# Patient Record
Sex: MALE | Race: WHITE | NOT HISPANIC OR LATINO | Employment: UNEMPLOYED | URBAN - METROPOLITAN AREA
[De-identification: names, ages, dates, MRNs, and addresses within clinical notes are randomized per-mention and may not be internally consistent; named-entity substitution may affect disease eponyms.]

---

## 2019-01-02 ENCOUNTER — HOSPITAL ENCOUNTER (EMERGENCY)
Facility: MEDICAL CENTER | Age: 24
End: 2019-01-03
Attending: EMERGENCY MEDICINE
Payer: COMMERCIAL

## 2019-01-02 DIAGNOSIS — F17.210 CONTINUOUS DEPENDENCE ON CIGARETTE SMOKING: ICD-10-CM

## 2019-01-02 DIAGNOSIS — F10.920 ALCOHOLIC INTOXICATION WITHOUT COMPLICATION (HCC): ICD-10-CM

## 2019-01-02 LAB — POC BREATHALIZER: 0.33 PERCENT (ref 0–0.01)

## 2019-01-02 PROCEDURE — 99285 EMERGENCY DEPT VISIT HI MDM: CPT

## 2019-01-02 PROCEDURE — 302970 POC BREATHALIZER

## 2019-01-02 PROCEDURE — 80307 DRUG TEST PRSMV CHEM ANLYZR: CPT

## 2019-01-02 PROCEDURE — 302970 POC BREATHALIZER: Performed by: EMERGENCY MEDICINE

## 2019-01-03 VITALS
RESPIRATION RATE: 16 BRPM | HEIGHT: 66 IN | WEIGHT: 140 LBS | DIASTOLIC BLOOD PRESSURE: 78 MMHG | SYSTOLIC BLOOD PRESSURE: 130 MMHG | HEART RATE: 78 BPM | BODY MASS INDEX: 22.5 KG/M2 | TEMPERATURE: 98.8 F | OXYGEN SATURATION: 96 %

## 2019-01-03 LAB
AMPHET UR QL SCN: NEGATIVE
BARBITURATES UR QL SCN: NEGATIVE
BENZODIAZ UR QL SCN: NEGATIVE
BZE UR QL SCN: NEGATIVE
CANNABINOIDS UR QL SCN: POSITIVE
METHADONE UR QL SCN: NEGATIVE
OPIATES UR QL SCN: NEGATIVE
OXYCODONE UR QL SCN: NEGATIVE
PCP UR QL SCN: NEGATIVE
POC BREATHALIZER: 0.05 PERCENT (ref 0–0.01)
POC BREATHALIZER: 0.09 PERCENT (ref 0–0.01)
PROPOXYPH UR QL SCN: ABNORMAL

## 2019-01-03 PROCEDURE — 302970 POC BREATHALIZER: Performed by: EMERGENCY MEDICINE

## 2019-01-03 PROCEDURE — 99244 OFF/OP CNSLTJ NEW/EST MOD 40: CPT | Performed by: PSYCHIATRY & NEUROLOGY

## 2019-01-03 PROCEDURE — 90791 PSYCH DIAGNOSTIC EVALUATION: CPT

## 2019-01-03 NOTE — ED NOTES
Patient's home medications have been reviewed by the pharmacy team.     Past Medical History:   Diagnosis Date   • Hypertension    • Psychiatric disorder        Patient's Medications   New Prescriptions    No medications on file   Previous Medications    No medications on file   Modified Medications    No medications on file   Discontinued Medications    AMOXICILLIN-CLAVULANATE (AUGMENTIN) 875-125 MG TABS    Take 1 Tab by mouth 2 times a day.    FLUTICASONE (FLONASE) 50 MCG/ACT NASAL SPRAY    Spray 2 Sprays in nose every day. Each Nostril    MONTELUKAST (SINGULAIR) 10 MG TABS    Take 1 Tab by mouth every day.        C/o os SI    A:  Medications do not appear to be contributing to current complaints.       P:    No recommendations at this time. No home medications to reorder.          Dina Ashraf, PharmD., BCPS

## 2019-01-03 NOTE — ED NOTES
Report received, patient moved to a different room for comfort measures, patient is calm at this time, SI precautions in place.  Will monitor.

## 2019-01-03 NOTE — ED NOTES
Urine collected and sent. Given an update about the plan in getting admitted to psychiatric facility.
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
## 2019-01-03 NOTE — ED PROVIDER NOTES
ED Provider Note    Patient did not have any events overnight.  He sobered.  He was persistently suicidal.  Arrangements will be made for psychiatric care.  No medical complaints or findings on examination.

## 2019-01-03 NOTE — ED PROVIDER NOTES
"ED Provider Note    CHIEF COMPLAINT  Chief Complaint   Patient presents with   • Suicidal Ideation     pt BIB EMS, per EMS pt was found intoxicated at [address] attempting to jump in front of cars and stated to EMS he planned to \"hang himself, shoot himself, or get hit by a car.\" pt has psychiatric hx with admission to [facility] in [place] (2016), meth use, cocaine use, early onset HTN. per EMS pt was tachy in the 130s but denies stimulant use today, but admits to cocaine use x 2 weeks ago.    • Alcohol Intoxication     POC breathalyzer 0.33 on arrival       HPI  Brittani Lu is a 23 y.o. male here for evaluation of alcohol abuse and suicidal ideation.  Patient was found by EMS to be attempting to jump in front of cars.  He then made a statement to them that he would hang himself or shoot himself at some points, and an attempt to end his life.  Patient has no noted trauma.  There is no further history from patient.    PAST MEDICAL HISTORY   has a past medical history of Hypertension and Psychiatric disorder.    SOCIAL HISTORY  Social History     Social History Main Topics   • Smoking status: Current Every Day Smoker     Packs/day: 0.50     Years: 4.00     Types: Cigarettes   • Smokeless tobacco: Never Used   • Alcohol use No   • Drug use: No   • Sexual activity: Not on file       SURGICAL HISTORY  patient denies any surgical history    CURRENT MEDICATIONS  Home Medications     Reviewed by Baltazar Vela R.N. (Registered Nurse) on 01/02/19 at 2242  Med List Status: Partial   Medication Last Dose Status   amoxicillin-clavulanate (AUGMENTIN) 875-125 MG TABS  Active   fluticasone (FLONASE) 50 MCG/ACT nasal spray  Active   montelukast (SINGULAIR) 10 MG TABS  Active                ALLERGIES  No Known Allergies    REVIEW OF SYSTEMS  See HPI for further details. Review of systems as above, otherwise all other systems are negative.     PHYSICAL EXAM  VITAL SIGNS: /71   Pulse (!) 125   " "Temp 36.8 °C (98.3 °F) (Temporal)   Resp 18   Ht 1.676 m (5' 6\")   Wt 63.5 kg (140 lb)   SpO2 98%   BMI 22.60 kg/m²     Constitutional: Well developed, well nourished. No acute distress.  HEENT: Normocephalic, atraumatic. MMM  Neck: Supple, Full range of motion   Chest/Pulmonary:  No respiratory distress.  Equal expansion   Musculoskeletal: No deformity, no edema, neurovascular intact.   Neuro: Clear speech, appropriate, cooperative, cranial nerves II-XII grossly intact.  Psych: Normal mood and affect    Results for orders placed or performed during the hospital encounter of 01/02/19   POC BREATHALIZER   Result Value Ref Range    POC Breathalizer 0.333 (A) 0.00 - 0.01 Percent       PROCEDURES     MEDICAL RECORD  I have reviewed patient's medical record and pertinent results are listed above.    COURSE & MEDICAL DECISION MAKING  I have reviewed any medical record information, laboratory studies and radiographic results as noted above.    1:55 AM  The pt is now more awake, and alert.  He states 'i hate myself' and 'im a piece of shit.'  These are the reasons that he wants to kill himself.  When sober, he will need to be seen by lifeskills.  The pt admits to alcohol, but denies any pill ingestion or overdose.     Al legal paperwork has been completed.     The pt has been signed out to my partner, who will have lifeskill see and evaluate him.      FINAL IMPRESSION  Alcohol abuse   Depression       Electronically signed by: Mars Gallardo, 1/3/2019 1:13 AM      "
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
## 2019-01-03 NOTE — ED TRIAGE NOTES
"Chief Complaint   Patient presents with   • Suicidal Ideation     pt BIB EMS, per EMS pt wqas found intoxicated at [address] attempting to jump in front of cars and stated to EMS he planned to \"hang himself, shoot himself, or get hit by a car.\" pt has psychiatric hx with admission to [facility] in [place] (2016), meth use, cocaine use, early onset HTN. per EMS pt was tachy in the 130s but denies stimulant use today, but admits to cocaine use x 2 weeks ago.    • Alcohol Intoxication     POC breathalyzer 0.33 on arrival       Pt appears intoxicated but cooperative and endorses previous suicide attempt.   "

## 2019-01-03 NOTE — DISCHARGE PLANNING
Medical Social Work    Referral: Legal Hold    Intervention: Legal Hold Paperwork given to SW by [facility] RN Marti    Legal Hold Initiated: Date: 1/3/2018 Time: 0200    Patient’s Insurance Listed on Face Sheet: None    Referrals sent to: [facility]    This referral contains the following information:  1) Face sheet __x__  2) Page 1 and Page 2 of Legal Hold _x___  3) Alert Team Assessment/Psych Assessment _x___  4) Head to toe physical exam ___x_  5) Urine Drug Screen __x__  6) Blood Alcohol __x__  7) Vital signs ___x_  8) Pregnancy test when applicable n/a___  9) Medications list __x__    Plan: Patient will transfer to mental health facility once acceptance is obtained

## 2019-01-03 NOTE — ED NOTES
Received report from Baltazar LALA assumed care done.  Breathalyzer rechecked=0.05  Pt calm and acting appropriately and polite at staff.  Sitter outside the room watching pt. Will notify behavioral health for consult.

## 2019-01-03 NOTE — ED NOTES
Break RN: Pt sleeping with visible rise and fall of chest. Resp are even and unlabored. NAD. Sitter in place.

## 2019-01-04 NOTE — DISCHARGE PLANNING
LSW conformed with [facility] staff member Frances regarding confirmed transportation at 11pm to [facility]     LSW completed packet and informed bedside RN regarding transfer at 11pm.     LSW placed call to [facility] staff member Mary regarding confirmed transfer at 11pm

## 2019-01-04 NOTE — ED NOTES
[facility] being [facility]  Belongings and transfer paperwork sent with patient.  Pt ambulatory from department without difficulty.

## 2019-01-04 NOTE — ED NOTES
Report received from Ramonita LALA.  Pt resting comfortably on cot.  Pt has no needs at this time.  Sitter outside room.  Will continue to monitor.

## 2019-01-04 NOTE — DISCHARGE PLANNING
RAMO completed [facility] request for transfer to [facility] informed by [facility] currenting admitting provider is Dr. Wilson and requested time for admission is 11pm.

## 2019-01-04 NOTE — ED NOTES
Patients father called and voiced some concerned, patients mom moved around and patient is a self hating type of person, patient has in the past been on antidepressants, patient is self medicating with alcohol to cope with this.  Parents are very concerned, they would like him to be in rehab and inpatient services.

## 2019-01-04 NOTE — DISCHARGE PLANNING
MSW received acceptance from [facility], however with pt's insurance has a high co-pay. MSW spoke with pt and he cannot afford it. Pt awaiting transfer to  or [facility].

## 2020-09-22 ENCOUNTER — HOSPITAL ENCOUNTER (INPATIENT)
Dept: HOSPITAL 8 - 3E | Age: 25
LOS: 2 days | Discharge: HOME | DRG: 753 | End: 2020-09-24
Attending: PSYCHIATRY & NEUROLOGY | Admitting: PSYCHIATRY & NEUROLOGY
Payer: MEDICAID

## 2020-09-22 VITALS — HEIGHT: 66 IN | BODY MASS INDEX: 24.77 KG/M2 | WEIGHT: 154.1 LBS

## 2020-09-22 VITALS — DIASTOLIC BLOOD PRESSURE: 77 MMHG | SYSTOLIC BLOOD PRESSURE: 116 MMHG

## 2020-09-22 DIAGNOSIS — F19.10: ICD-10-CM

## 2020-09-22 DIAGNOSIS — F10.20: ICD-10-CM

## 2020-09-22 DIAGNOSIS — F17.200: ICD-10-CM

## 2020-09-22 DIAGNOSIS — R45.851: ICD-10-CM

## 2020-09-22 DIAGNOSIS — G47.00: ICD-10-CM

## 2020-09-22 DIAGNOSIS — J45.909: ICD-10-CM

## 2020-09-22 DIAGNOSIS — F31.30: Primary | ICD-10-CM

## 2020-09-22 LAB — MICROSCOPIC: (no result)

## 2020-09-22 PROCEDURE — 93005 ELECTROCARDIOGRAM TRACING: CPT

## 2020-09-22 PROCEDURE — 81003 URINALYSIS AUTO W/O SCOPE: CPT

## 2020-09-22 RX ADMIN — DIVALPROEX SODIUM SCH MG: 500 TABLET, DELAYED RELEASE ORAL at 22:37

## 2020-09-22 RX ADMIN — NICOTINE SCH PATCH: 21 PATCH, EXTENDED RELEASE TRANSDERMAL at 22:37

## 2020-09-22 RX ADMIN — DOXEPIN HYDROCHLORIDE SCH MG: 25 CAPSULE ORAL at 22:37

## 2020-09-23 VITALS — SYSTOLIC BLOOD PRESSURE: 145 MMHG | DIASTOLIC BLOOD PRESSURE: 80 MMHG

## 2020-09-23 VITALS — DIASTOLIC BLOOD PRESSURE: 66 MMHG | SYSTOLIC BLOOD PRESSURE: 107 MMHG

## 2020-09-23 RX ADMIN — NICOTINE SCH PATCH: 21 PATCH, EXTENDED RELEASE TRANSDERMAL at 08:27

## 2020-09-23 RX ADMIN — DOXEPIN HYDROCHLORIDE SCH MG: 25 CAPSULE ORAL at 19:55

## 2020-09-23 RX ADMIN — DIVALPROEX SODIUM SCH MG: 500 TABLET, DELAYED RELEASE ORAL at 19:55

## 2020-09-23 RX ADMIN — DIVALPROEX SODIUM SCH MG: 500 TABLET, DELAYED RELEASE ORAL at 08:27

## 2020-09-24 VITALS — SYSTOLIC BLOOD PRESSURE: 122 MMHG | DIASTOLIC BLOOD PRESSURE: 80 MMHG

## 2020-09-24 RX ADMIN — DIVALPROEX SODIUM SCH MG: 500 TABLET, DELAYED RELEASE ORAL at 08:59

## 2020-09-24 RX ADMIN — NICOTINE SCH PATCH: 21 PATCH, EXTENDED RELEASE TRANSDERMAL at 08:59

## 2020-10-12 ENCOUNTER — HOSPITAL ENCOUNTER (EMERGENCY)
Facility: MEDICAL CENTER | Age: 25
End: 2020-10-12
Attending: EMERGENCY MEDICINE
Payer: MEDICAID

## 2020-10-12 VITALS
HEIGHT: 66 IN | OXYGEN SATURATION: 98 % | WEIGHT: 134.92 LBS | BODY MASS INDEX: 21.68 KG/M2 | SYSTOLIC BLOOD PRESSURE: 118 MMHG | DIASTOLIC BLOOD PRESSURE: 70 MMHG | RESPIRATION RATE: 16 BRPM | TEMPERATURE: 97 F | HEART RATE: 80 BPM

## 2020-10-12 DIAGNOSIS — F10.920 ALCOHOLIC INTOXICATION WITHOUT COMPLICATION (HCC): ICD-10-CM

## 2020-10-12 LAB — POC BREATHALIZER: 0.24 PERCENT (ref 0–0.01)

## 2020-10-12 PROCEDURE — 99284 EMERGENCY DEPT VISIT MOD MDM: CPT

## 2020-10-12 PROCEDURE — 302970 POC BREATHALIZER: Performed by: EMERGENCY MEDICINE

## 2020-10-12 PROCEDURE — A9270 NON-COVERED ITEM OR SERVICE: HCPCS

## 2020-10-12 PROCEDURE — 700102 HCHG RX REV CODE 250 W/ 637 OVERRIDE(OP)

## 2020-10-12 RX ORDER — IBUPROFEN 600 MG/1
600 TABLET ORAL ONCE
Status: COMPLETED | OUTPATIENT
Start: 2020-10-12 | End: 2020-10-12

## 2020-10-12 RX ADMIN — IBUPROFEN 600 MG: 600 TABLET, FILM COATED ORAL at 06:26

## 2020-10-12 ASSESSMENT — ENCOUNTER SYMPTOMS: FEVER: 0

## 2020-10-12 NOTE — ED TRIAGE NOTES
"Chief Complaint   Patient presents with   • Alcohol Intoxication     PT BIB [facility] because he was not able to walk.     Blood Pressure 118/70   Pulse 80   Temperature 36.1 °C (97 °F)   Respiration 16   Height 1.676 m (5' 6\")   Weight 61.2 kg (134 lb 14.7 oz)   Oxygen Saturation 98%   Body Mass Index 21.78 kg/m²     "

## 2020-10-12 NOTE — ED NOTES
"Pt discharged home. Pt able to ambulate safely with a strong steady gait. Pt in possession of belongings. Pt provided discharge education and information pertaining to medications and follow up appointments. Pt received copy of discharge instructions and verbalized understanding. /70   Pulse 80   Temp 36.1 °C (97 °F)   Resp 16   Ht 1.676 m (5' 6\")   Wt 61.2 kg (134 lb 14.7 oz)   SpO2 98%   BMI 21.78 kg/m²     "

## 2020-10-12 NOTE — ED PROVIDER NOTES
"ED Provider Note   10/12/2020  3:16 AM    Means of Arrival: EMS  History obtained by: EMS  Limitations: intoxicated    CHIEF COMPLAINT  Chief Complaint   Patient presents with   • Alcohol Intoxication     PT BRENDA WITT because he was not able to walk.       HPI  Brittani Lu is a 25 y.o. male who is homeless presenting with EMS after he was found down.  Smells of alcohol.  He is not providing a history besides saying he is homeless.  Alcohol level on arrival just over 0.2 on breathalyzer.    REVIEW OF SYSTEMS  Review of Systems   Reason unable to perform ROS: Limited due to not wanting to participate and alcohol intoxication.   Constitutional: Negative for fever.   He says he has no concerns in general.  See HPI for further details.     PAST MEDICAL HISTORY   has a past medical history of Asthma, Hypertension, and Psychiatric disorder.    SOCIAL HISTORY  Social History     Tobacco Use   • Smoking status: Current Every Day Smoker     Packs/day: 0.50     Years: 4.00     Pack years: 2.00     Types: Cigarettes   • Smokeless tobacco: Never Used   Substance and Sexual Activity   • Alcohol use: Yes     Comment: 5 drinks daily    • Drug use: Yes     Types: Inhaled   • Sexual activity: Not on file       SURGICAL HISTORY  patient denies any surgical history    CURRENT MEDICATIONS  Home Medications    **Home medications have not yet been reviewed for this encounter**         ALLERGIES  No Known Allergies    PHYSICAL EXAM  VITAL SIGNS: /70   Pulse 80   Temp 36.1 °C (97 °F)   Resp 16   Ht 1.676 m (5' 6\")   Wt 61.2 kg (134 lb 14.7 oz)   SpO2 98%   BMI 21.78 kg/m²    Pulse ox interpretation: I interpret this pulse ox as normal.  Constitutional: Laying on ED stretcher in hallway, awakens to voice.  HENT: Normocephalic, Atraumatic, Bilateral external ears normal. Nose normal.   Eyes: Pupils are equal. Conjunctiva normal, non-icteric.   Heart: Regular rate and hythm, no murmurs.    Lungs: No respiratory distress, " regular respirations.   Abdomen: Soft and nontender.  Skin: Warm, Dry, No erythema, No rash.   Neurologic: Alert and oriented to person but not place.  His speech is mildly slurred.  He is moving all extremities spontaneously.  MSK: Dirt on extremities but no obvious signs of trauma.  Psychiatric: Affect normal, Judgment normal, Mood normal, Appears appropriate and not intoxicated.   Physical Exam      COURSE & MEDICAL DECISION MAKING  Pertinent Labs & Imaging studies reviewed. (See chart for details)    3:16 AM This is an emergent evaluation of a 25 y.o., male who presents with alcohol tox occasion.  He does awaken to verbal stimulus.  He says he has no complaints.  He still shows signs of intoxication with slurred speech.  Will reevaluate in 30 to 60 minutes.    06:19 AM  Now up and ambulatory in department.  Asking for discharge paperwork.  He has no medical concerns.  He was discharged in good condition.     The patient will return for worsening symptoms and is stable at the time of discharge. The patient verbalizes understanding. Guidance was provided on appropriate use of medications including driving under the influence, overdose, and side effects.     FINAL IMPRESSION  1. Alcoholic intoxication without complication (HCC)               Electronically signed by: Gerald Batista II, M.D., 10/12/2020 3:16 AM

## 2020-12-31 ENCOUNTER — HOSPITAL ENCOUNTER (EMERGENCY)
Facility: MEDICAL CENTER | Age: 25
End: 2020-12-31
Attending: EMERGENCY MEDICINE
Payer: MEDICAID

## 2020-12-31 VITALS
WEIGHT: 134 LBS | RESPIRATION RATE: 16 BRPM | SYSTOLIC BLOOD PRESSURE: 119 MMHG | TEMPERATURE: 98.3 F | DIASTOLIC BLOOD PRESSURE: 63 MMHG | HEIGHT: 66 IN | HEART RATE: 105 BPM | OXYGEN SATURATION: 96 % | BODY MASS INDEX: 21.53 KG/M2

## 2020-12-31 DIAGNOSIS — F43.21 SITUATIONAL DEPRESSION: ICD-10-CM

## 2020-12-31 LAB
AMPHET UR QL SCN: NEGATIVE
BARBITURATES UR QL SCN: NEGATIVE
BENZODIAZ UR QL SCN: NEGATIVE
BZE UR QL SCN: NEGATIVE
CANNABINOIDS UR QL SCN: POSITIVE
METHADONE UR QL SCN: NEGATIVE
OPIATES UR QL SCN: NEGATIVE
OXYCODONE UR QL SCN: NEGATIVE
PCP UR QL SCN: NEGATIVE
POC BREATHALIZER: 0.14 PERCENT (ref 0–0.01)
PROPOXYPH UR QL SCN: NEGATIVE

## 2020-12-31 PROCEDURE — 302970 POC BREATHALIZER: Performed by: EMERGENCY MEDICINE

## 2020-12-31 PROCEDURE — 99284 EMERGENCY DEPT VISIT MOD MDM: CPT

## 2020-12-31 PROCEDURE — 80307 DRUG TEST PRSMV CHEM ANLYZR: CPT

## 2021-01-01 NOTE — DISCHARGE PLANNING
This pt denied any si, hi or psychosis.he was discharged to [facility] for assessment for treatment; including psychiatric stabilization and medication management.

## 2021-01-01 NOTE — DISCHARGE PLANNING
Medical Social Work     EFREM spoke to the RN and he requested SW assistance with seeing if there are open beds at the [facility]. [facility] called the [facility] and spoke with Venkat and advised him of the pt. Venkat advised SW they do have open male beds. [facility] advised the [facility] that we will be sending the pt over for an assessment.     EFREM provided a taxi voucher for the pt to get to the [facility].

## 2021-01-01 NOTE — ED NOTES
PT roomed immedietly. All non-essential equipment removed. PT changed into hospital gown and all personal belongings removed from room.   Security called for belongings search

## 2021-01-01 NOTE — ED PROVIDER NOTES
"ED Provider Note    Scribed for Jaya Grimes M.D. by Jaya Grimes M.D.. 12/31/2020,  8:52 PM.    CHIEF COMPLAINT  Chief Complaint   Patient presents with   • Suicidal Ideation     PT was placed on a legal hold from [facility]. PT was found actively jumping in front of cars in hopes they would \"hit me and kill me\"        HPI  Brittani Lu is a 25 y.o. male with a history of alcoholism, polysubstance abuse including methamphetamines and cocaine, and unspecified psychiatric illness who presents to the Emergency Department brought in on a legal hold initiated by [place] police, because the patient was found jumping in front of cars, reportedly hoping that they would hit him and kill him.  This is nearly identical to a visit from January 2 of 2019.  He has had a couple of other visits related to alcoholism.  He is not a frequent visitor for suicidal thoughts or attempts.  At the bedside, he is upbeat, animated, and actually quite future oriented.  He reports that he is trying to get his photo ID back, so that he can get a job, but that is been difficult right now.  He says that life is hard, and that \"sometimes it feels like having no life would be better than having a hard life,\" but also admits that the acting out today by walking in the road was an emotional reaction to an argument with a  at the homeless shelter, and then he got kicked out.  He was kicked out because he forgot to check in his knives.  I think it is noteworthy that he did not have any intention or statements regarding hurting himself or killing himself with a knife he had in his possession.  I think the standing in the road was more acting out.    REVIEW OF SYSTEMS  See HPI for further details. All other systems are negative.     PAST MEDICAL HISTORY   has a past medical history of Asthma, Hypertension, and Psychiatric disorder.    SOCIAL HISTORY  Social History     Tobacco Use   • Smoking status: Current Every Day Smoker     " "Packs/day: 0.50     Years: 4.00     Pack years: 2.00     Types: Cigarettes   • Smokeless tobacco: Never Used   Substance and Sexual Activity   • Alcohol use: Yes     Comment: 5 drinks daily    • Drug use: Yes     Types: Inhaled   • Sexual activity: Not on file     Social History     Substance and Sexual Activity   Drug Use Yes   • Types: Inhaled       SURGICAL HISTORY  patient denies any surgical history    CURRENT MEDICATIONS  Home Medications    **Home medications have not yet been reviewed for this encounter**         ALLERGIES  No Known Allergies    PHYSICAL EXAM  VITAL SIGNS: /63   Pulse (!) 105   Temp 36.8 °C (98.3 °F) (Temporal)   Resp 16   Ht 1.676 m (5' 6\")   Wt 60.8 kg (134 lb)   SpO2 96%   BMI 21.63 kg/m²   Pulse ox interpretation: I interpret this pulse ox as normal.  Constitutional: Alert in no apparent distress.  HENT: No signs of trauma, Bilateral external ears normal, Nose normal.   Eyes: Pupils are equal and reactive, Conjunctiva normal, Non-icteric.   Neck: Normal range of motion, Supple, No stridor.    Cardiovascular: Normal peripheral perfusion  Thorax & Lungs: Unlabored respirations, equal chest expansion, no accessory muscle use  Abdomen: Non-distended  Skin:  No erythema, No rash.   Back: Normal alignment and ROM  Extremities: No gross deformity  Musculoskeletal: Good range of motion in all major joints.   Neurologic: Alert, Normal motor function, No focal deficits noted.   Psychiatric: Affect upbeat, future-oriented outlook, Judgment normal, Mood normal.      DIAGNOSTIC STUDIES / PROCEDURES    LABS  Labs Reviewed   URINE DRUG SCREEN - Abnormal; Notable for the following components:       Result Value    Cannabinoid Metab Positive (*)     All other components within normal limits   POC BREATHALIZER - Abnormal; Notable for the following components:    POC Breathalizer 0.139 (*)     All other components within normal limits     All labs reviewed by me.    RADIOLOGY  No orders to " "display     The radiologist's interpretation of all radiological studies have been reviewed by me.    COURSE & MEDICAL DECISION MAKING  Nursing notes, VS, PMSFHx reviewed in chart.     8:52 PM Patient seen and examined at bedside.  He is [place], admits that the events of tonight were emotional outburst, though I do not think that this future oriented person is a threat to himself or others.  I said that I would talk to social work and perhaps we can find him some place to stay, such as [facility], other than the [facility] center, and the patient reports \"that would be fantastic!\"    9:30 PM we were able to get the patient a bed at the [facility], and he will be transported there by cab voucher.     The patient will return for new or worsening symptoms and is stable at the time of discharge.    The patient is referred to a primary physician for blood pressure management, diabetic screening, and for all other preventative health concerns.    DISPOSITION:  Patient will be discharged home in stable condition.    FOLLOW UP:  [facility], by cab, now.            OUTPATIENT MEDICATIONS:  There are no discharge medications for this patient.          FINAL IMPRESSION  1. Situational depression          "

## 2021-01-01 NOTE — ED TRIAGE NOTES
"Brittani Lu    Chief Complaint   Patient presents with   • Suicidal Ideation     PT was placed on a legal hold from [facility]. PT was found actively jumping in front of cars in hopes they would \"hit me and kill me\"        Vitals:    12/31/20 2045   BP: 119/63   Pulse: (!) 105   Resp: 16   Temp: 36.8 °C (98.3 °F)   SpO2: 96%       "

## 2023-11-21 ENCOUNTER — HOSPITAL ENCOUNTER (EMERGENCY)
Facility: MEDICAL CENTER | Age: 28
End: 2023-11-22
Attending: EMERGENCY MEDICINE
Payer: COMMERCIAL

## 2023-11-21 DIAGNOSIS — F10.929 ALCOHOLIC INTOXICATION WITH COMPLICATION (HCC): Primary | ICD-10-CM

## 2023-11-21 DIAGNOSIS — F43.0 ACUTE SITUATIONAL DISTURBANCE: ICD-10-CM

## 2023-11-21 LAB
ALBUMIN SERPL BCP-MCNC: 4.3 G/DL (ref 3.2–4.9)
ALBUMIN/GLOB SERPL: 1.6 G/DL
ALP SERPL-CCNC: 88 U/L (ref 30–99)
ALT SERPL-CCNC: 31 U/L (ref 2–50)
ANION GAP SERPL CALC-SCNC: 13 MMOL/L (ref 7–16)
AST SERPL-CCNC: 31 U/L (ref 12–45)
BASOPHILS # BLD AUTO: 0.3 % (ref 0–1.8)
BASOPHILS # BLD: 0.02 K/UL (ref 0–0.12)
BILIRUB SERPL-MCNC: <0.2 MG/DL (ref 0.1–1.5)
BUN SERPL-MCNC: 9 MG/DL (ref 8–22)
CALCIUM ALBUM COR SERPL-MCNC: 8.2 MG/DL (ref 8.5–10.5)
CALCIUM SERPL-MCNC: 8.4 MG/DL (ref 8.5–10.5)
CHLORIDE SERPL-SCNC: 108 MMOL/L (ref 96–112)
CO2 SERPL-SCNC: 23 MMOL/L (ref 20–33)
CREAT SERPL-MCNC: 0.87 MG/DL (ref 0.5–1.4)
EOSINOPHIL # BLD AUTO: 0.12 K/UL (ref 0–0.51)
EOSINOPHIL NFR BLD: 2 % (ref 0–6.9)
ERYTHROCYTE [DISTWIDTH] IN BLOOD BY AUTOMATED COUNT: 42.8 FL (ref 35.9–50)
ETHANOL BLD-MCNC: 317.8 MG/DL
GFR SERPLBLD CREATININE-BSD FMLA CKD-EPI: 120 ML/MIN/1.73 M 2
GLOBULIN SER CALC-MCNC: 2.7 G/DL (ref 1.9–3.5)
GLUCOSE SERPL-MCNC: 100 MG/DL (ref 65–99)
HCT VFR BLD AUTO: 46.8 % (ref 42–52)
HGB BLD-MCNC: 16.4 G/DL (ref 14–18)
IMM GRANULOCYTES # BLD AUTO: 0.02 K/UL (ref 0–0.11)
IMM GRANULOCYTES NFR BLD AUTO: 0.3 % (ref 0–0.9)
LYMPHOCYTES # BLD AUTO: 1.38 K/UL (ref 1–4.8)
LYMPHOCYTES NFR BLD: 22.4 % (ref 22–41)
MCH RBC QN AUTO: 31.2 PG (ref 27–33)
MCHC RBC AUTO-ENTMCNC: 35 G/DL (ref 32.3–36.5)
MCV RBC AUTO: 89.1 FL (ref 81.4–97.8)
MONOCYTES # BLD AUTO: 0.31 K/UL (ref 0–0.85)
MONOCYTES NFR BLD AUTO: 5 % (ref 0–13.4)
NEUTROPHILS # BLD AUTO: 4.3 K/UL (ref 1.82–7.42)
NEUTROPHILS NFR BLD: 70 % (ref 44–72)
NRBC # BLD AUTO: 0 K/UL
NRBC BLD-RTO: 0 /100 WBC (ref 0–0.2)
PLATELET # BLD AUTO: 298 K/UL (ref 164–446)
PMV BLD AUTO: 9.3 FL (ref 9–12.9)
POTASSIUM SERPL-SCNC: 3.9 MMOL/L (ref 3.6–5.5)
PROT SERPL-MCNC: 7 G/DL (ref 6–8.2)
RBC # BLD AUTO: 5.25 M/UL (ref 4.7–6.1)
SODIUM SERPL-SCNC: 144 MMOL/L (ref 135–145)
WBC # BLD AUTO: 6.2 K/UL (ref 4.8–10.8)

## 2023-11-21 PROCEDURE — 80053 COMPREHEN METABOLIC PANEL: CPT

## 2023-11-21 PROCEDURE — 700111 HCHG RX REV CODE 636 W/ 250 OVERRIDE (IP): Mod: UD | Performed by: EMERGENCY MEDICINE

## 2023-11-21 PROCEDURE — 82077 ASSAY SPEC XCP UR&BREATH IA: CPT

## 2023-11-21 PROCEDURE — 85025 COMPLETE CBC W/AUTO DIFF WBC: CPT

## 2023-11-21 PROCEDURE — 36415 COLL VENOUS BLD VENIPUNCTURE: CPT

## 2023-11-21 PROCEDURE — 99285 EMERGENCY DEPT VISIT HI MDM: CPT

## 2023-11-21 PROCEDURE — 96374 THER/PROPH/DIAG INJ IV PUSH: CPT

## 2023-11-21 PROCEDURE — 94760 N-INVAS EAR/PLS OXIMETRY 1: CPT

## 2023-11-21 PROCEDURE — 96372 THER/PROPH/DIAG INJ SC/IM: CPT | Mod: XU

## 2023-11-21 RX ORDER — LORAZEPAM 2 MG/ML
2 INJECTION INTRAMUSCULAR ONCE
Status: COMPLETED | OUTPATIENT
Start: 2023-11-21 | End: 2023-11-21

## 2023-11-21 RX ORDER — HALOPERIDOL 5 MG/ML
5 INJECTION INTRAMUSCULAR ONCE
Status: COMPLETED | OUTPATIENT
Start: 2023-11-21 | End: 2023-11-21

## 2023-11-21 RX ORDER — DIPHENHYDRAMINE HYDROCHLORIDE 50 MG/ML
25 INJECTION INTRAMUSCULAR; INTRAVENOUS ONCE
Status: COMPLETED | OUTPATIENT
Start: 2023-11-21 | End: 2023-11-21

## 2023-11-21 RX ADMIN — HALOPERIDOL LACTATE 5 MG: 5 INJECTION, SOLUTION INTRAMUSCULAR at 20:15

## 2023-11-21 RX ADMIN — DIPHENHYDRAMINE HYDROCHLORIDE 25 MG: 50 INJECTION, SOLUTION INTRAMUSCULAR; INTRAVENOUS at 19:30

## 2023-11-21 RX ADMIN — HALOPERIDOL LACTATE 5 MG: 5 INJECTION, SOLUTION INTRAMUSCULAR at 19:30

## 2023-11-21 RX ADMIN — LORAZEPAM 2 MG: 2 INJECTION, SOLUTION INTRAMUSCULAR; INTRAVENOUS at 19:30

## 2023-11-21 RX ADMIN — LORAZEPAM 2 MG: 2 INJECTION, SOLUTION INTRAMUSCULAR; INTRAVENOUS at 23:31

## 2023-11-21 RX ADMIN — LORAZEPAM 2 MG: 2 INJECTION, SOLUTION INTRAMUSCULAR; INTRAVENOUS at 22:30

## 2023-11-21 ASSESSMENT — PAIN DESCRIPTION - PAIN TYPE: TYPE: ACUTE PAIN

## 2023-11-22 VITALS
TEMPERATURE: 97.7 F | RESPIRATION RATE: 16 BRPM | WEIGHT: 145 LBS | SYSTOLIC BLOOD PRESSURE: 130 MMHG | HEART RATE: 99 BPM | OXYGEN SATURATION: 94 % | HEIGHT: 66 IN | BODY MASS INDEX: 23.3 KG/M2 | DIASTOLIC BLOOD PRESSURE: 70 MMHG

## 2023-11-22 LAB — POC BREATHALIZER: 0.04 PERCENT (ref 0–0.01)

## 2023-11-22 PROCEDURE — 302970 POC BREATHALIZER: Performed by: EMERGENCY MEDICINE

## 2023-11-22 NOTE — ED NOTES
Bedside report received from off going RN/tech: Cisco RN, assumed care of patient.  POC discussed with patient. Call light within reach, all needs addressed at this time.       Fall risk interventions in place: Move the patient closer to the nurse's station, Patient's personal possessions are with in their safe reach, Place socks on patient, Place fall risk sign on patient's door, Give patient urinal if applicable, Keep floor surfaces clean and dry, Accompanied to restroom, and Other (comment required) (all applicable per [place] Fall risk assessment)   Continuous monitoring: Pulse Ox  IVF/IV medications: Not Applicable   Oxygen: Room Air  Bedside sitter: Pt on L2k SI with 1:1 sitter Valeria (name), Report given to sitter, checklist completed, and checklist completed, stop sign in doorway  Isolation: Not Applicable

## 2023-11-22 NOTE — ED PROVIDER NOTES
"  ER Provider Note    Scribed for Jacklyn Sidhu D.o. by Maira Cannon. 11/21/2023  7:10 PM    Primary Care Provider: Pcp Pt States None    CHIEF COMPLAINT  Chief Complaint   Patient presents with    Suicidal Ideation     BRENDA WITT from [facility] for SI with multiple plans (OD, hanging, and fighting with other people)     EXTERNAL RECORDS REVIEWED  [facility] 3/11/21. Visit requesting help with alcohol problems.     HPI/ROS  LIMITATION TO HISTORY   Select: Intoxication  OUTSIDE HISTORIAN(S):  Law Enforcement provided additional history.     Brittani Lu is a 28 y.o. male who presents to the ED brought in by [place] police for suicidal ideation. The patient was at [facility] earlier today and was intoxicated and violent with staff. He told the staff there that he is suicidal. The patient was then brought into the ED where he is currently being aggressive with staff and attempting to run out of the room. The patient states that he is upset because his wife recently cheated on him.     PAST MEDICAL HISTORY  Past Medical History:   Diagnosis Date    Asthma     Hypertension     Psychiatric disorder        SURGICAL HISTORY  No past surgical history on file.    FAMILY HISTORY  No family history on file.    SOCIAL HISTORY   reports that he has been smoking cigarettes. He has a 2.0 pack-year smoking history. He has never used smokeless tobacco. He reports current alcohol use. He reports current drug use. Drug: Inhaled.Homeless.     CURRENT MEDICATIONS  Previous Medications    No medications on file       ALLERGIES  Patient has no known allergies.    PHYSICAL EXAM  Pulse 87   Ht 1.676 m (5' 6\")   Wt 65.8 kg (145 lb)   SpO2 99%   BMI 23.40 kg/m²   Physical Exam  Vitals and nursing note reviewed.   Constitutional:       Comments: Walking with unsteady gait in ER hallway, yelling profanities.    HENT:      Head: Normocephalic and atraumatic.      Mouth/Throat:      Mouth: Mucous membranes are moist.   Eyes: "      Extraocular Movements: Extraocular movements intact.      Conjunctiva/sclera: Conjunctivae normal.      Pupils: Pupils are equal, round, and reactive to light.   Cardiovascular:      Rate and Rhythm: Regular rhythm. Tachycardia present.   Pulmonary:      Effort: Pulmonary effort is normal.      Breath sounds: Normal breath sounds.   Musculoskeletal:         General: No deformity.   Neurological:      Comments: Alert, yelling, speech slurred, truncal ataxia. Moving all extremities without limitations.           DIAGNOSTIC STUDIES    Labs:   Results for orders placed or performed during the hospital encounter of 11/21/23   CBC WITH DIFFERENTIAL   Result Value Ref Range    WBC 6.2 4.8 - 10.8 K/uL    RBC 5.25 4.70 - 6.10 M/uL    Hemoglobin 16.4 14.0 - 18.0 g/dL    Hematocrit 46.8 42.0 - 52.0 %    MCV 89.1 81.4 - 97.8 fL    MCH 31.2 27.0 - 33.0 pg    MCHC 35.0 32.3 - 36.5 g/dL    RDW 42.8 35.9 - 50.0 fL    Platelet Count 298 164 - 446 K/uL    MPV 9.3 9.0 - 12.9 fL    Neutrophils-Polys 70.00 44.00 - 72.00 %    Lymphocytes 22.40 22.00 - 41.00 %    Monocytes 5.00 0.00 - 13.40 %    Eosinophils 2.00 0.00 - 6.90 %    Basophils 0.30 0.00 - 1.80 %    Immature Granulocytes 0.30 0.00 - 0.90 %    Nucleated RBC 0.00 0.00 - 0.20 /100 WBC    Neutrophils (Absolute) 4.30 1.82 - 7.42 K/uL    Lymphs (Absolute) 1.38 1.00 - 4.80 K/uL    Monos (Absolute) 0.31 0.00 - 0.85 K/uL    Eos (Absolute) 0.12 0.00 - 0.51 K/uL    Baso (Absolute) 0.02 0.00 - 0.12 K/uL    Immature Granulocytes (abs) 0.02 0.00 - 0.11 K/uL    NRBC (Absolute) 0.00 K/uL   COMP METABOLIC PANEL   Result Value Ref Range    Sodium 144 135 - 145 mmol/L    Potassium 3.9 3.6 - 5.5 mmol/L    Chloride 108 96 - 112 mmol/L    Co2 23 20 - 33 mmol/L    Anion Gap 13.0 7.0 - 16.0    Glucose 100 (H) 65 - 99 mg/dL    Bun 9 8 - 22 mg/dL    Creatinine 0.87 0.50 - 1.40 mg/dL    Calcium 8.4 (L) 8.5 - 10.5 mg/dL    Correct Calcium 8.2 (L) 8.5 - 10.5 mg/dL    AST(SGOT) 31 12 - 45 U/L     "ALT(SGPT) 31 2 - 50 U/L    Alkaline Phosphatase 88 30 - 99 U/L    Total Bilirubin <0.2 0.1 - 1.5 mg/dL    Albumin 4.3 3.2 - 4.9 g/dL    Total Protein 7.0 6.0 - 8.2 g/dL    Globulin 2.7 1.9 - 3.5 g/dL    A-G Ratio 1.6 g/dL   DIAGNOSTIC ALCOHOL   Result Value Ref Range    Diagnostic Alcohol 317.8 (H) <10.1 mg/dL   ESTIMATED GFR   Result Value Ref Range    GFR (CKD-EPI) 120 >60 mL/min/1.73 m 2        EKG:       COURSE & MEDICAL DECISION MAKING     ED Observation Status? Yes; I am placing the patient in to an observation status due to a diagnostic uncertainty as well as therapeutic intensity. Patient placed in observation status at 8:03 PM, 11/21/2023.     Observation plan is as follows: Will monitor the patient's symptoms while lab work results. Will reassess and update the patient after results are reviewed.          INITIAL ASSESSMENT, COURSE AND PLAN  Care Narrative:     7:12 PM - Called acutely to the patient's room. The patient is a 28 year old male who was brought in by [place] police department for aggressive behavior towards [facility] staff. He told staff at that time that he was suicidal. The patient has been aggressive with staff since arrival and has been unable to redirect multiple times. When asked if the patient had any medical problems he responded with \"only the ones I am going to give to your daughter\" and when asked where he lives he responded \"my home is in your daughter's pussy.\" The patient also threatened to urinate on security and nursing staff.  Security had to physically restrain the patient and he will be medicated with ativan, benadryl, and haldol and placed in four point restraints. Psychiatric hold initiated because of his comments about suicide. He is not providing any further details about this. Will reassess when sober.     7:49 PM - The patient is still resisting restraints and yelling. I ordered another 5 mg Haldol.     8:39 PM - The patient is resting calmly, will remove restraints. "     9:57 PM - The patient is awake and yelling again. I ordered 2 mg Ativan.     11:15 PM  Continues to be intermittently agitated, still requiring 4 point restraints. Additional ativan 2mg IV ordered.     12:53 AM  Resting, no longer resisting restraints. Was spitting at providers earlier and spit milton placed. Signed out to DANNY Watts, Behavioral RN aware for plan for eval once sober. Will remain in ED observation.     Labs resulted which show normal cbc, cmp, and a high alcohol level of 317.     CRITICAL CARE  The very real possibilty of a deterioration of this patient's condition required the highest level of my preparedness for sudden, emergent intervention.  I provided critical care services, which included medication orders, frequent reevaluations of the patient's condition and response to treatment, ordering and reviewing test results, and discussing the case with various consultants.  The critical care time associated with the care of the patient was 35 minutes. Review chart for interventions. This time is exclusive of any other billable procedures.      PROBLEM LIST  #Alcoho intoxication    #Suicidal thoughts    DISPOSITION AND DISCUSSIONS      Barriers to care at this time, including but not limited to: Patient does not have established PCP, Patient is homeless, Patient lacks transportation , Patient does not have insurance, Patient had difficult affording medications, and Patient lacks financial resources.         FINAL DIANGOSIS  1. Alcoholic intoxication without complication (HCC)    2. Suicidal thoughts           Maira MCCLAIN (Federica), am scribing for, and in the presence of, Gerald Batista II, M.D..    Electronically signed by: Maira Santo), 11/21/2023    Gerald MCCLAIN II, M.D. personally performed the services described in this documentation, as scribed by Maira Cannon in my presence, and it is both accurate and complete.     The note accurately  reflects work and decisions made by me.  Gerald Batista II, M.D.  11/22/2023  12:56 AM

## 2023-11-22 NOTE — ED NOTES
.Bedside report received from off going RN/tech: DAI Pond, assumed care of patient.  POC discussed with patient. Call light within reach, all needs addressed at this time.       Fall risk interventions in place: Move the patient closer to the nurse's station, Patient's personal possessions are with in their safe reach, Place socks on patient, Place fall risk sign on patient's door, Give patient urinal if applicable, Keep floor surfaces clean and dry, Accompanied to restroom, and Human-sitter if tele-sitter fails (all applicable per [facility] Fall risk assessment)   Continuous monitoring: Not Applicable   IVF/IV medications: Not Applicable   Oxygen: Room Air  Bedside sitter: Pt on L2k SI with 1:1 sitter Manny (name)  Isolation: Not Applicable

## 2023-11-22 NOTE — ED NOTES
Patient removed from restraints at this time.    Reason for call: medication question after discharge     Caller inquiring if pt is to continue or discontinue his Hydralazine, as it is no longer on the medication list, but not listed as a changed medication.    Please follow up with caller today.    Pt informed message sent to the pt's provider to follow up with the office later today.    Caller verbalized understanding, denying additional questions or concerns at this time, encouraged to call back as they may arise.    Reason for Disposition  • Caller has nonurgent medication question about med that PCP prescribed and triager unable to answer question    Protocols used: MEDICATION QUESTION CALL-A-

## 2023-11-22 NOTE — ED TRIAGE NOTES
Brittani Lu  28 y.o. male  Chief Complaint   Patient presents with    Suicidal Ideation     BRENDA WITT from [facility] for SI with multiple plans (OD, hanging, and fighting with other people)     Pt BIB EMS for above complaint.

## 2023-11-22 NOTE — ED NOTES
L2K completed at 11/21/2023 at 1915.    Patient verbally and physically aggressive with staff. Placed on 4 point hard restraints by security. At 1920

## 2023-11-22 NOTE — ED NOTES
Rounded on pt. Respirations equal and unlabored. No acute distress at this time. 1:1 in line of site of site of patient. Safety measures in place.

## 2023-11-22 NOTE — ED NOTES
Bedside report given to Cisco LALA. On L2K, 1:1 sitter in direct view of patient. Report given OPAL Bryant.

## 2023-11-22 NOTE — CONSULTS
Pt able to state that he was BIB EMS for being intoxicated. He remembers making statements about suicidal ideation and acting aggressive toward [facility] staff. Diagnostic alcohol at time of admission was 317.8. At time of consult, pt is clinically sober at BAL 0.041. He states that he does not drink daily but has done similar in the past. Per EMR, pt was seen in the [facility] 3/11/21 for alcohol intoxication and released. He was seen in the ED by Alert Team 12/31/20 for the same thing. At that time, he was discharged with plan to go to inpatient treatment voluntarily. It is unclear if he went, as he denies any previous inpatient psychiatric treatment. He now denies suicidal ideation. He is not interested in addiction or mental health treatment. He wishes to discharge. No acute mental health crisis at this time. Provided pt with treatment resources should he change his mind in the future.

## 2023-11-22 NOTE — DISCHARGE SUMMARY
"  ED Observation Discharge Summary    Patient:Brittani Lu  Patient : 1995  Patient MRN: [id number]  Patient PCP: Pcp Pt States None    Admit Date: 2023  Discharge Date and Time: 23 7:57 AM  Discharge Diagnosis: Acute alcohol intoxication, acute situational disturbance  Discharge Attending: Louis Leyva M.D.  Discharge Service: ED Observation    ED Course  Brittani is a 28 y.o. male who was evaluated at [facility] with aggressive, belligerent behavior in the setting of alcohol use.  He made a variety of statements about harming himself.  He required medication to help him participate in his care and for the safety of himself and others.  He was watched.  He was medically cleared.  He was turned over to me for evaluation of his mental health.  I have gone in to talk to him about the statements he made last night.  He really has no recollection of any of that, but states \"I was just drunk.\"  He denies any thoughts of harming himself or harming others.  No previous self-harm.  He admits that he drinks too much, but believes he can control the drinking himself.  He declines any assistance with this.  Presently other than being thirsty, he has no complaints at all.  I have talked with Mayte, the mental health nurse, who is seen the patient agrees that he is candidate for discharge home.  She is talk with him about outpatient resources, again he is declining this.    Discharge Exam:  /87   Pulse (!) 104   Resp 18   Ht 1.676 m (5' 6\")   Wt 65.8 kg (145 lb)   SpO2 97%   BMI 23.40 kg/m² .    Constitutional: Awake and alert. Nontoxic  HENT:  Grossly normal  Eyes: Grossly normal  Neck: Normal range of motion  Cardiovascular: Normal heart rate   Thorax & Lungs: No respiratory distress  Abdomen: Nontender  Skin:  No pathologic rash.   Extremities: Well perfused  Psychiatric: Affect normal    Labs  Results for orders placed or performed during the hospital encounter of 23   CBC WITH DIFFERENTIAL "   Result Value Ref Range    WBC 6.2 4.8 - 10.8 K/uL    RBC 5.25 4.70 - 6.10 M/uL    Hemoglobin 16.4 14.0 - 18.0 g/dL    Hematocrit 46.8 42.0 - 52.0 %    MCV 89.1 81.4 - 97.8 fL    MCH 31.2 27.0 - 33.0 pg    MCHC 35.0 32.3 - 36.5 g/dL    RDW 42.8 35.9 - 50.0 fL    Platelet Count 298 164 - 446 K/uL    MPV 9.3 9.0 - 12.9 fL    Neutrophils-Polys 70.00 44.00 - 72.00 %    Lymphocytes 22.40 22.00 - 41.00 %    Monocytes 5.00 0.00 - 13.40 %    Eosinophils 2.00 0.00 - 6.90 %    Basophils 0.30 0.00 - 1.80 %    Immature Granulocytes 0.30 0.00 - 0.90 %    Nucleated RBC 0.00 0.00 - 0.20 /100 WBC    Neutrophils (Absolute) 4.30 1.82 - 7.42 K/uL    Lymphs (Absolute) 1.38 1.00 - 4.80 K/uL    Monos (Absolute) 0.31 0.00 - 0.85 K/uL    Eos (Absolute) 0.12 0.00 - 0.51 K/uL    Baso (Absolute) 0.02 0.00 - 0.12 K/uL    Immature Granulocytes (abs) 0.02 0.00 - 0.11 K/uL    NRBC (Absolute) 0.00 K/uL   COMP METABOLIC PANEL   Result Value Ref Range    Sodium 144 135 - 145 mmol/L    Potassium 3.9 3.6 - 5.5 mmol/L    Chloride 108 96 - 112 mmol/L    Co2 23 20 - 33 mmol/L    Anion Gap 13.0 7.0 - 16.0    Glucose 100 (H) 65 - 99 mg/dL    Bun 9 8 - 22 mg/dL    Creatinine 0.87 0.50 - 1.40 mg/dL    Calcium 8.4 (L) 8.5 - 10.5 mg/dL    Correct Calcium 8.2 (L) 8.5 - 10.5 mg/dL    AST(SGOT) 31 12 - 45 U/L    ALT(SGPT) 31 2 - 50 U/L    Alkaline Phosphatase 88 30 - 99 U/L    Total Bilirubin <0.2 0.1 - 1.5 mg/dL    Albumin 4.3 3.2 - 4.9 g/dL    Total Protein 7.0 6.0 - 8.2 g/dL    Globulin 2.7 1.9 - 3.5 g/dL    A-G Ratio 1.6 g/dL   DIAGNOSTIC ALCOHOL   Result Value Ref Range    Diagnostic Alcohol 317.8 (H) <10.1 mg/dL   ESTIMATED GFR   Result Value Ref Range    GFR (CKD-EPI) 120 >60 mL/min/1.73 m 2   POC BREATHALIZER   Result Value Ref Range    POC Breathalizer 0.041 (A) 0.00 - 0.01 Percent       Radiology  No orders to display       Medications:   New Prescriptions    No medications on file       My final assessment includes acute alcohol intoxication, acute  situational disturbance  Upon Reevaluation, the patient's condition has: Improved; and will be discharged.  Instructions on finding treatment for addiction, and a handout on how much is too much alcohol.    Patient discharged from ED Observation status at 0 800 (Time) 11/22/2023  (Date).     Total time spent on this ED Observation discharge encounter is < 30 Minutes    Electronically signed by: Louis Leyva M.D., 11/22/2023 7:57 AM

## 2023-11-22 NOTE — ED NOTES
Pt provided back belongings. Got dressed. Refused d/c paperwork, refused lift of resources.   Ambulatory out of ER with steady gait.

## 2025-04-06 ENCOUNTER — HOSPITAL ENCOUNTER (EMERGENCY)
Facility: MEDICAL CENTER | Age: 30
End: 2025-04-06
Payer: COMMERCIAL

## 2025-04-06 VITALS
OXYGEN SATURATION: 97 % | HEART RATE: 118 BPM | DIASTOLIC BLOOD PRESSURE: 94 MMHG | TEMPERATURE: 97.4 F | RESPIRATION RATE: 16 BRPM | SYSTOLIC BLOOD PRESSURE: 121 MMHG

## 2025-04-06 PROCEDURE — 302449 STATCHG TRIAGE ONLY (STATISTIC)

## 2025-04-06 NOTE — ED NOTES
This RN went to call pt. Pt not in lobby or bathrooms. The PAR said pt decided to walk out. Pt walking with steady gait off of the ED parking lot